# Patient Record
Sex: MALE | Race: WHITE | URBAN - METROPOLITAN AREA
[De-identification: names, ages, dates, MRNs, and addresses within clinical notes are randomized per-mention and may not be internally consistent; named-entity substitution may affect disease eponyms.]

---

## 2017-06-06 ENCOUNTER — HISTORICAL (OUTPATIENT)
Dept: LAB | Facility: HOSPITAL | Age: 67
End: 2017-06-06

## 2017-06-06 LAB
ABS NEUT (OLG): 6.5 X10(3)/MCL
ALBUMIN SERPL-MCNC: 2.9 GM/DL (ref 3.4–5)
APTT PPP: 27 SECOND(S) (ref 21–30)
BUN SERPL-MCNC: 13 MG/DL (ref 7–18)
CALCIUM SERPL-MCNC: 8.6 MG/DL (ref 8.5–10.1)
CHLORIDE SERPL-SCNC: 101 MMOL/L (ref 98–107)
CO2 SERPL-SCNC: 26 MMOL/L (ref 21–32)
CREAT SERPL-MCNC: 0.84 MG/DL (ref 0.7–1.3)
ERYTHROCYTE [DISTWIDTH] IN BLOOD BY AUTOMATED COUNT: 15.7 % (ref 11.5–14.8)
GLUCOSE SERPL-MCNC: 121 MG/DL (ref 74–106)
HCT VFR BLD AUTO: 35.7 % (ref 36.2–49.3)
HGB BLD-MCNC: 12.1 GM/DL (ref 12.6–17.3)
INR PPP: 1
MCH RBC QN AUTO: 27.8 PG (ref 28.5–33.8)
MCHC RBC AUTO-ENTMCNC: 33.9 % (ref 32.6–37)
MCV RBC AUTO: 81.9 FL (ref 80–99)
PHOSPHATE SERPL-MCNC: 3.9 MG/DL (ref 2.5–4.9)
PLATELET # BLD AUTO: 426 X10(3)/MCL (ref 136–369)
PMV BLD AUTO: 9.1 FL (ref 7.4–10.4)
POTASSIUM SERPL-SCNC: 4.5 MMOL/L (ref 3.5–5.1)
PROTHROMBIN TIME: 10.8 SECOND(S) (ref 9.8–12)
RBC # BLD AUTO: 4.36 X10(6)/MCL (ref 4.19–5.75)
SODIUM SERPL-SCNC: 135 MMOL/L (ref 136–145)
WBC # SPEC AUTO: 10.6 X10(3)/MCL (ref 4–10.5)

## 2017-06-14 ENCOUNTER — HISTORICAL (OUTPATIENT)
Dept: ADMINISTRATIVE | Facility: HOSPITAL | Age: 67
End: 2017-06-14

## 2017-07-27 ENCOUNTER — HISTORICAL (OUTPATIENT)
Dept: SURGERY | Facility: HOSPITAL | Age: 67
End: 2017-07-27

## 2017-07-27 LAB
ABS NEUT (OLG): 4.7 X10(3)/MCL
ALBUMIN SERPL-MCNC: 3.2 GM/DL (ref 3.4–5)
ALBUMIN/GLOB SERPL: 1 {RATIO}
ALP SERPL-CCNC: 113 UNIT/L (ref 45–117)
ALT SERPL-CCNC: 20 UNIT/L (ref 16–61)
APTT PPP: 23 SECOND(S) (ref 21–30)
AST SERPL-CCNC: 18 UNIT/L (ref 15–37)
BASOPHILS NFR BLD MANUAL: 0 %
BILIRUB SERPL-MCNC: 0.4 MG/DL (ref 0.2–1)
BILIRUBIN DIRECT+TOT PNL SERPL-MCNC: <0.1 MG/DL (ref 0–0.2)
BILIRUBIN DIRECT+TOT PNL SERPL-MCNC: >0.3 MG/DL (ref 0–1)
BUN SERPL-MCNC: 17 MG/DL (ref 7–18)
CALCIUM SERPL-MCNC: 8.7 MG/DL (ref 8.5–10.1)
CHLORIDE SERPL-SCNC: 106 MMOL/L (ref 98–107)
CO2 SERPL-SCNC: 27 MMOL/L (ref 21–32)
CREAT SERPL-MCNC: 0.84 MG/DL (ref 0.7–1.3)
EOSINOPHIL NFR BLD MANUAL: 1 % (ref 0–5)
ERYTHROCYTE [DISTWIDTH] IN BLOOD BY AUTOMATED COUNT: 15.8 % (ref 11.5–14.8)
GLOBULIN SER-MCNC: 4 GM/DL (ref 2–4)
GLUCOSE SERPL-MCNC: 85 MG/DL (ref 74–106)
GRANULOCYTES NFR BLD MANUAL: 54 %
HCT VFR BLD AUTO: 36.1 % (ref 36.2–49.3)
HGB BLD-MCNC: 11.9 GM/DL (ref 12.6–17.3)
INR PPP: 0.9
LYMPHOCYTES NFR BLD MANUAL: 36 % (ref 24–44)
MCH RBC QN AUTO: 25.8 PG (ref 28.5–33.8)
MCHC RBC AUTO-ENTMCNC: 33 % (ref 32.6–37)
MCV RBC AUTO: 78.3 FL (ref 80–99)
MONOCYTES NFR BLD MANUAL: 9 % (ref 4–8)
PLATELET # BLD AUTO: 366 X10(3)/MCL (ref 136–369)
PLATELET # BLD EST: ADEQUATE 10*3/UL
PMV BLD AUTO: 9.7 FL (ref 7.4–10.4)
POTASSIUM SERPL-SCNC: 4.2 MMOL/L (ref 3.5–5.1)
PROT SERPL-MCNC: 7.5 GM/DL (ref 6.4–8.2)
PROTHROMBIN TIME: 10.4 SECOND(S) (ref 9.8–12)
RBC # BLD AUTO: 4.61 X10(6)/MCL (ref 4.19–5.75)
RBC MORPH BLD: NORMAL
SODIUM SERPL-SCNC: 140 MMOL/L (ref 136–145)
WBC # SPEC AUTO: 9 X10(3)/MCL (ref 4–10.5)

## 2022-04-30 NOTE — OP NOTE
DATE OF SURGERY:    06/14/2017    SURGEON:  ZAINA Singletary MD    PREOPERATIVE DIAGNOSES:    1. Distal esophageal thickening with associated lymphadenopathy seen on CT scan.    2. Hard of hearing/deaf.   3. Hypertension.   4. Dyslipidemia.   5. Peripheral arterial disease.   6. Left-sided May-Thurner syndrome.   7. Chronic tobacco abuse.    POSTOPERATIVE DIAGNOSES:    1. Distal esophageal thickening with associated lymphadenopathy seen on CT scan.    2. Hard of hearing/deaf.   3. Hypertension.   4. Dyslipidemia.   5. Peripheral arterial disease.   6. Left-sided May-Thurner syndrome.   7. Chronic tobacco abuse.    PROCEDURE:  EGD with biopsy.    ASSISTANT:  OR Staff    FINDINGS:    1. Distal esophageal thickening from 28 to 30 cm from the incisors - likely related to severe inflammation, possible malignancy.   2. Hiatal hernia - 3 X 3 cm with the proximal stomach noted within the thoracic cavity.    3. Z-line at 30 cm.   4. Diffuse mild gastritis.   5. Normal duodenum.  6. Inflamed vocal cords.    SPECIMENS:    1. Antrum.  2. Distal esophageal inflammation/lesion.    COMPLICATIONS:  None.    ANESTHESIA:  Sedation.    PROCEDURE IN DETAIL:  The patient was brought to the OR, placed in supine position and anesthesia was induced.  Bite block was placed.  Scope was advanced through the mouth and pharynx into the esophagus down through the stomach into the duodenum to approximately the fourth portion.  Normal duodenum was visualized.  Scope was withdrawn with insufflation.  Area of papilla was identified.  No abnormal findings were noted, although the papilla could not be directly visualized with the forward facing scope.  The scope was additionally withdrawn.  The antrum was noted.  This was biopsied.  There was some mild antritis and diffuse gastritis.  There were no other lesions within the stomach.  Retroflexion demonstrated a normal cardia.  There was a 3 X 3 mm hiatal hernia with stomach entering the  chest cavity.  Scope was withdrawn.  Z-line was at 30 cm.  The distal esophagus just proximal to the Z-line was very inflamed with some heaped mucosa in the area, all suspected to be related to inflammation, although biopsy was taken in the event that malignancy was part of this.  This was between 28 and 30 cm from the incisors.  Scope was additionally withdrawn.  The esophagus was otherwise normal.  The vocal cords and areas surrounding were inflamed consistent with reflux disease.  The scope was completely withdrawn.    PLAN:  Protonix and Carafate.  He will benefit from a repeat scope in the future to ensure the inflammation is resolving and otherwise will plan for follow-up of pathology.       He may resume his Eliquis tomorrow.       Thank you Dr. Woodall and Dr. Gallagher for the consultation.  I appreciate the opportunity to be involved in the patient's care.        ______________________________  M. MD CALISTA Menezes/JARRED  DD:  06/14/2017  Time:  08:10AM  DT:  06/14/2017  Time:  03:18PM  Job #:  [id number]

## 2022-04-30 NOTE — OP NOTE
DATE OF SURGERY:    07/27/2017    SURGEON:  Alvaro Woodall MD    PREOPERATIVE DIAGNOSIS:  Large nonhealing left lower extremity, chronic venous insufficiency wound, now ready for debridement and TheraSkin placement.    POSTOPERATIVE DIAGNOSIS:  Large nonhealing left lower extremity, chronic venous insufficiency wound, now ready for debridement and TheraSkin placement.    ANESTHESIA:  Local with IV sedation.    COMPLICATIONS:  None.    DRAINS:  None.    PROCEDURES:    1. Full thickness skin, subcutaneous tissue and fascial sharp surgical debridement of an open 5.0 x 6.0 cm left anterior ankle wound.  2. Pulsavac nonexcisional debridement of the above noted wound using 1000 mL of antibiotics and Dermacyn soak solution.  3. Placement of an appropriate sized TheraSkin on above noted wound with a protocol dressing.    PROCEDURE IN DETAIL:  After the patient was brought to the operating suite, adequate local and IV sedation were obtained.  The entire left lower extremity was prepped sterilely.  This patient had an open 5.0 x 6.0 cm, clean left lower extremity wound that was appropriate for treatment.  The wound was granular, noninfected and non-foul smelling.  I first performed Pulsavac non-excisional debridement without complications, and this assisted the debridement.  I performed full thickness skin, subcutaneous tissue and fascial sharp excisional debridement of the above-noted wound using 15 blade and scissors without complications.  I placed an appropriate sized TheraSkin without complications and a protocol dressing was placed.  This terminated the procedure without complications.      ______________________________  Alvaro Woodall MD    MEGHAN/CD  DD:  10/18/2017  Time:  08:58AM  DT:  10/18/2017  Time:  01:23PM  Job #:  [id number]

## 2022-04-30 NOTE — H&P
DICTATED BY:  Leland Gongora NP.    REASON FOR HOSPITALIZATION:  A left lower extremity nonhealing wound in need of debridement and second TheraSkin graft placement.    HISTORY OF PRESENT ILLNESS:  Patient is well known to our service.  He has recently undergone multiple venous procedures for left-sided May-Thurner syndrome, and on his last hospitalization had a debridement in the left lower extremity with TheraSkin graft placement.  The TheraSkin did an excellent job of healing a portion of the wound, and now it is time for reapplication of the graft to finish the process.  He is being admitted to [facility] for this purpose.    PAST MEDICAL HISTORY:  Significant for hypertension, dyslipidemia, peripheral artery disease, May-Thurner syndrome.    PHYSICAL EXAMINATION:  GENERAL:  The patient is awake, alert.  No acute distress.   HEENT:  Unremarkable.   NECK:  Soft and supple.  No JVD, goiter or mass.     RESPIRATIONS:  Exhibit emphysemic changes but respirations are unlabored.  Lungs are otherwise clear bilaterally per stethoscope.   HEART:  Regular rate and rhythm.  S1 and S2.  No murmur, click, gallop or rub.   ABDOMEN:  Soft and nontender.  No zuleima organomegaly.   SKIN:  Warm, dry and intact.   EXTREMITIES:  Upper are unremarkable.  Lower extremities are significant for 1 to 2+ pitting edema.  Left lower extremity has significant hyperpigmentation with a large wound to the left anterior shin as well as the left lateral calf area which has healed significantly since our last procedure.   NEUROLOGIC:  Grossly intact 2 through 12.  No focal abnormalities.    IMPRESSION:    1. Chronic venous insufficiency, chronic venous hypertension with a CEAP classification of 6 left lower extremity with impending need for debridement and __________ EpiFix graft placement after common iliac and external iliac vein PTA and stenting for May-Thurner syndrome.  2. Hypertension.   3. Dyslipidemia.   4. Peripheral  artery disease.    PLAN:  The patient will be admitted to [facility], where he will be taken down to surgery for a simple wound debridement and TheraSkin graft placement.  His length of stay will depend upon his response to prescribed therapy, as well as intervention given.        ______________________________  MD MEGHAN Ralph/GRAEME  DD:  07/23/2017  Time:  08:40PM  DT:  07/23/2017  Time:  09:10PM  Job #:  [id number]    The H&P was reviewed, the patient was examined, and the following changes to the patients condition are noted:  ______________________________________________________________________________  ______________________________________________________________________________  ______________________________________________________________________________  [  ] No changes to the patient's condition:      ______________________________                                             ___________________  PHYSICIAN SIGNATURE                                                             DATE/TIME